# Patient Record
Sex: MALE | Race: WHITE
[De-identification: names, ages, dates, MRNs, and addresses within clinical notes are randomized per-mention and may not be internally consistent; named-entity substitution may affect disease eponyms.]

---

## 2019-02-16 ENCOUNTER — HOSPITAL ENCOUNTER (EMERGENCY)
Dept: HOSPITAL 26 - MED | Age: 39
Discharge: HOME | End: 2019-02-16
Payer: MEDICAID

## 2019-02-16 VITALS — HEIGHT: 72 IN | BODY MASS INDEX: 32.13 KG/M2 | WEIGHT: 237.25 LBS

## 2019-02-16 VITALS — DIASTOLIC BLOOD PRESSURE: 73 MMHG | SYSTOLIC BLOOD PRESSURE: 123 MMHG

## 2019-02-16 VITALS — SYSTOLIC BLOOD PRESSURE: 134 MMHG | DIASTOLIC BLOOD PRESSURE: 79 MMHG

## 2019-02-16 DIAGNOSIS — J10.1: Primary | ICD-10-CM

## 2019-02-16 DIAGNOSIS — Z71.6: ICD-10-CM

## 2019-02-16 DIAGNOSIS — F17.200: ICD-10-CM

## 2019-02-16 PROCEDURE — 94640 AIRWAY INHALATION TREATMENT: CPT

## 2019-02-16 PROCEDURE — 99283 EMERGENCY DEPT VISIT LOW MDM: CPT

## 2019-02-16 PROCEDURE — 71045 X-RAY EXAM CHEST 1 VIEW: CPT

## 2019-02-16 PROCEDURE — 36415 COLL VENOUS BLD VENIPUNCTURE: CPT

## 2019-02-16 PROCEDURE — 87804 INFLUENZA ASSAY W/OPTIC: CPT

## 2019-02-16 NOTE — NUR
38/M BIB FAMILY C/O NASAL/CHEST CONGESTION WITH MOIST COUGH, UNABLE TO COUGH 
OUT PHLEGM X 3 WEEKS. NO ACCESSORY MUSCLE USE NOTED. DENIES N/V/D; SKIN IS 
PINK/WARM/DRY; AAOX4 WITH EVEN AND STEADY GAIT; LUNGS CONGESTED BL; HR EVEN AND 
REGULAR; PT DENIES ANY FEVER, CP OR SOB AT THIS TIME; PATIENT STATES PAIN OF 
0/10 AT THIS TIME. PATIENT POSITIONED FOR COMFORT; HOB ELEVATED; BEDRAILS UP 
X2; BED DOWN. ER MD MADE AWARE OF PT STATUS.

## 2019-02-16 NOTE — NUR
Patient discharged with v/s stable. Written and verbal after care instructions 
given and explained. Patient alert, oriented and verbalized understanding of 
instructions. Ambulatory with steady gait. All questions addressed prior to 
discharge. ID band removed. Patient advised to follow up with PMD. Rx of 
albuterol and promethazine given. Patient educated on indication of medication 
including possible reaction and side effects. Opportunity to ask questions 
provided and answered.